# Patient Record
Sex: MALE | Race: WHITE | NOT HISPANIC OR LATINO | URBAN - METROPOLITAN AREA
[De-identification: names, ages, dates, MRNs, and addresses within clinical notes are randomized per-mention and may not be internally consistent; named-entity substitution may affect disease eponyms.]

---

## 2017-09-26 ENCOUNTER — AMBULATORY SURGICAL CENTER (AMBULATORY)
Dept: URBAN - METROPOLITAN AREA SURGERY 17 | Facility: SURGERY | Age: 52
End: 2017-09-26
Payer: COMMERCIAL

## 2017-09-26 ENCOUNTER — OFFICE (AMBULATORY)
Dept: URBAN - METROPOLITAN AREA CLINIC 64 | Facility: CLINIC | Age: 52
End: 2017-09-26
Payer: COMMERCIAL

## 2017-09-26 VITALS
SYSTOLIC BLOOD PRESSURE: 131 MMHG | HEART RATE: 68 BPM | DIASTOLIC BLOOD PRESSURE: 93 MMHG | HEART RATE: 63 BPM | HEART RATE: 62 BPM | RESPIRATION RATE: 16 BRPM | SYSTOLIC BLOOD PRESSURE: 140 MMHG | DIASTOLIC BLOOD PRESSURE: 66 MMHG | WEIGHT: 130 LBS | HEART RATE: 45 BPM | SYSTOLIC BLOOD PRESSURE: 105 MMHG | RESPIRATION RATE: 24 BRPM | DIASTOLIC BLOOD PRESSURE: 62 MMHG | SYSTOLIC BLOOD PRESSURE: 142 MMHG | HEIGHT: 64 IN | SYSTOLIC BLOOD PRESSURE: 104 MMHG | SYSTOLIC BLOOD PRESSURE: 92 MMHG | HEART RATE: 65 BPM | SYSTOLIC BLOOD PRESSURE: 136 MMHG | OXYGEN SATURATION: 99 % | SYSTOLIC BLOOD PRESSURE: 118 MMHG | TEMPERATURE: 98.2 F | RESPIRATION RATE: 20 BRPM | RESPIRATION RATE: 10 BRPM | SYSTOLIC BLOOD PRESSURE: 102 MMHG | SYSTOLIC BLOOD PRESSURE: 117 MMHG | HEART RATE: 56 BPM | SYSTOLIC BLOOD PRESSURE: 123 MMHG | TEMPERATURE: 97.9 F | SYSTOLIC BLOOD PRESSURE: 101 MMHG | DIASTOLIC BLOOD PRESSURE: 74 MMHG | OXYGEN SATURATION: 100 % | HEART RATE: 58 BPM | DIASTOLIC BLOOD PRESSURE: 72 MMHG | HEART RATE: 61 BPM | OXYGEN SATURATION: 96 % | DIASTOLIC BLOOD PRESSURE: 57 MMHG | RESPIRATION RATE: 21 BRPM | SYSTOLIC BLOOD PRESSURE: 137 MMHG | DIASTOLIC BLOOD PRESSURE: 67 MMHG | OXYGEN SATURATION: 98 % | HEART RATE: 47 BPM | RESPIRATION RATE: 19 BRPM | DIASTOLIC BLOOD PRESSURE: 61 MMHG | DIASTOLIC BLOOD PRESSURE: 79 MMHG | HEART RATE: 54 BPM | DIASTOLIC BLOOD PRESSURE: 89 MMHG | DIASTOLIC BLOOD PRESSURE: 78 MMHG | HEART RATE: 64 BPM | HEART RATE: 49 BPM | SYSTOLIC BLOOD PRESSURE: 106 MMHG | HEART RATE: 66 BPM | DIASTOLIC BLOOD PRESSURE: 70 MMHG

## 2017-09-26 DIAGNOSIS — D12.3 BENIGN NEOPLASM OF TRANSVERSE COLON: ICD-10-CM

## 2017-09-26 DIAGNOSIS — D12.2 BENIGN NEOPLASM OF ASCENDING COLON: ICD-10-CM

## 2017-09-26 DIAGNOSIS — D12.0 BENIGN NEOPLASM OF CECUM: ICD-10-CM

## 2017-09-26 DIAGNOSIS — K63.5 POLYP OF COLON: ICD-10-CM

## 2017-09-26 DIAGNOSIS — K64.8 OTHER HEMORRHOIDS: ICD-10-CM

## 2017-09-26 DIAGNOSIS — Z86.010 PERSONAL HISTORY OF COLONIC POLYPS: ICD-10-CM

## 2017-09-26 LAB
GI HISTOLOGY: A. UNSPECIFIED: (no result)
GI HISTOLOGY: B. SELECT: (no result)
GI HISTOLOGY: C. UNSPECIFIED: (no result)
GI HISTOLOGY: D. SELECT: (no result)
GI HISTOLOGY: PDF REPORT: (no result)

## 2017-09-26 PROCEDURE — 88305 TISSUE EXAM BY PATHOLOGIST: CPT

## 2017-09-26 PROCEDURE — 45385 COLONOSCOPY W/LESION REMOVAL: CPT | Mod: 33

## 2017-09-26 RX ADMIN — PROPOFOL 50 MG: 10 INJECTION, EMULSION INTRAVENOUS at 08:00

## 2017-09-26 RX ADMIN — PROPOFOL 50 MG: 10 INJECTION, EMULSION INTRAVENOUS at 08:03

## 2017-09-26 RX ADMIN — LIDOCAINE HYDROCHLORIDE 25 MG: 10 INJECTION, SOLUTION EPIDURAL; INFILTRATION; INTRACAUDAL; PERINEURAL at 07:49

## 2017-09-26 RX ADMIN — PROPOFOL 100 MG: 10 INJECTION, EMULSION INTRAVENOUS at 07:49

## 2017-09-26 RX ADMIN — PROPOFOL 50 MG: 10 INJECTION, EMULSION INTRAVENOUS at 08:17

## 2017-09-26 RX ADMIN — PROPOFOL 50 MG: 10 INJECTION, EMULSION INTRAVENOUS at 07:56

## 2017-09-26 RX ADMIN — PROPOFOL 25 MG: 10 INJECTION, EMULSION INTRAVENOUS at 08:07

## 2017-09-26 RX ADMIN — PROPOFOL 25 MG: 10 INJECTION, EMULSION INTRAVENOUS at 08:11

## 2017-09-26 RX ADMIN — PROPOFOL 50 MG: 10 INJECTION, EMULSION INTRAVENOUS at 07:57

## 2017-09-26 RX ADMIN — PROPOFOL 25 MG: 10 INJECTION, EMULSION INTRAVENOUS at 07:50

## 2017-09-26 RX ADMIN — PROPOFOL 25 MG: 10 INJECTION, EMULSION INTRAVENOUS at 07:49

## 2017-10-02 ENCOUNTER — OFFICE (AMBULATORY)
Dept: URBAN - METROPOLITAN AREA CLINIC 75 | Facility: CLINIC | Age: 52
End: 2017-10-02
Payer: COMMERCIAL

## 2017-10-02 VITALS — HEIGHT: 64 IN

## 2017-10-02 DIAGNOSIS — K64.2 THIRD DEGREE HEMORRHOIDS: ICD-10-CM

## 2017-10-02 PROBLEM — K64.8 HEMORRHOIDS, INTERNAL: Status: ACTIVE | Noted: 2017-10-02

## 2017-10-02 PROBLEM — K64.1 SECOND DEGREE HEMORRHOIDS: Status: ACTIVE | Noted: 2017-10-02

## 2017-10-02 PROBLEM — K62.5 RECTAL BLEEDING: Status: ACTIVE | Noted: 2017-10-02

## 2017-10-02 PROCEDURE — 46221 LIGATION OF HEMORRHOID(S): CPT | Performed by: INTERNAL MEDICINE

## 2017-10-02 NOTE — SERVICEHPINOTES
Mr. Silva is a pleasant 52-year-old gentleman who has a history of polyps, status post recent colonoscopy. He also has issues with hemorrhoids. He'll have bleeding at times will have swelling. He has to push the hemorrhoids in at times. He therefore presents for hemorrhoid banding.

## 2017-10-16 ENCOUNTER — OFFICE (AMBULATORY)
Dept: URBAN - METROPOLITAN AREA CLINIC 75 | Facility: CLINIC | Age: 52
End: 2017-10-16
Payer: COMMERCIAL

## 2017-10-16 VITALS — HEIGHT: 64 IN

## 2017-10-16 DIAGNOSIS — K64.1 SECOND DEGREE HEMORRHOIDS: ICD-10-CM

## 2017-10-16 PROCEDURE — 46221 LIGATION OF HEMORRHOID(S): CPT | Performed by: INTERNAL MEDICINE

## 2017-10-16 NOTE — SERVICEHPINOTES
Mr. Sliva is a pleasant 52-year-old gentleman who has a history of polyps, status post recent colonoscopy. He also has issues with hemorrhoids. He'll have bleeding at times will have swelling. He has to push the hemorrhoids in at times. He therefore presents for his 2nd hemorrhoid banding

## 2017-10-30 ENCOUNTER — OFFICE (AMBULATORY)
Dept: URBAN - METROPOLITAN AREA CLINIC 75 | Facility: CLINIC | Age: 52
End: 2017-10-30
Payer: COMMERCIAL

## 2017-10-30 VITALS — HEIGHT: 64 IN

## 2017-10-30 DIAGNOSIS — K64.1 SECOND DEGREE HEMORRHOIDS: ICD-10-CM

## 2017-10-30 DIAGNOSIS — K64.4 RESIDUAL HEMORRHOIDAL SKIN TAGS: ICD-10-CM

## 2017-10-30 DIAGNOSIS — K62.5 HEMORRHAGE OF ANUS AND RECTUM: ICD-10-CM

## 2017-10-30 PROCEDURE — 46221 LIGATION OF HEMORRHOID(S): CPT | Performed by: INTERNAL MEDICINE

## 2020-09-17 VITALS
SYSTOLIC BLOOD PRESSURE: 87 MMHG | DIASTOLIC BLOOD PRESSURE: 44 MMHG | DIASTOLIC BLOOD PRESSURE: 67 MMHG | HEART RATE: 69 BPM | HEART RATE: 79 BPM | TEMPERATURE: 99.6 F | RESPIRATION RATE: 8 BRPM | RESPIRATION RATE: 14 BRPM | RESPIRATION RATE: 17 BRPM | TEMPERATURE: 95.2 F | OXYGEN SATURATION: 99 % | OXYGEN SATURATION: 97 % | HEIGHT: 64 IN | OXYGEN SATURATION: 100 % | DIASTOLIC BLOOD PRESSURE: 45 MMHG | SYSTOLIC BLOOD PRESSURE: 90 MMHG | WEIGHT: 145 LBS | HEART RATE: 62 BPM | HEART RATE: 59 BPM | SYSTOLIC BLOOD PRESSURE: 118 MMHG | RESPIRATION RATE: 18 BRPM | OXYGEN SATURATION: 98 % | RESPIRATION RATE: 16 BRPM | DIASTOLIC BLOOD PRESSURE: 52 MMHG | DIASTOLIC BLOOD PRESSURE: 93 MMHG | HEART RATE: 72 BPM | SYSTOLIC BLOOD PRESSURE: 89 MMHG | DIASTOLIC BLOOD PRESSURE: 79 MMHG | RESPIRATION RATE: 21 BRPM | HEART RATE: 68 BPM | RESPIRATION RATE: 20 BRPM | SYSTOLIC BLOOD PRESSURE: 88 MMHG | SYSTOLIC BLOOD PRESSURE: 119 MMHG | HEART RATE: 67 BPM | HEART RATE: 60 BPM | SYSTOLIC BLOOD PRESSURE: 92 MMHG | SYSTOLIC BLOOD PRESSURE: 138 MMHG

## 2020-09-21 ENCOUNTER — OFFICE (AMBULATORY)
Dept: URBAN - METROPOLITAN AREA LAB 2 | Facility: LAB | Age: 55
End: 2020-09-21
Payer: COMMERCIAL

## 2020-09-21 DIAGNOSIS — Z20.828 CONTACT WITH AND (SUSPECTED) EXPOSURE TO OTHER VIRAL COMMUNI: ICD-10-CM

## 2020-09-21 PROBLEM — Z86.010 SURVEILLANCE DUE TO PRIOR COLONIC NEOPLASIA: Status: ACTIVE | Noted: 2020-09-22

## 2020-09-21 PROCEDURE — 87633 RESP VIRUS 12-25 TARGETS: CPT | Performed by: INTERNAL MEDICINE

## 2020-09-21 PROCEDURE — U0002 COVID-19 LAB TEST NON-CDC: HCPCS | Performed by: INTERNAL MEDICINE

## 2020-09-22 ENCOUNTER — OFFICE (AMBULATORY)
Dept: URBAN - METROPOLITAN AREA PATHOLOGY 4 | Facility: PATHOLOGY | Age: 55
End: 2020-09-22
Payer: COMMERCIAL

## 2020-09-22 ENCOUNTER — AMBULATORY SURGICAL CENTER (AMBULATORY)
Dept: URBAN - METROPOLITAN AREA SURGERY 17 | Facility: SURGERY | Age: 55
End: 2020-09-22
Payer: COMMERCIAL

## 2020-09-22 DIAGNOSIS — D12.0 BENIGN NEOPLASM OF CECUM: ICD-10-CM

## 2020-09-22 DIAGNOSIS — Z86.010 PERSONAL HISTORY OF COLONIC POLYPS: ICD-10-CM

## 2020-09-22 DIAGNOSIS — D12.3 BENIGN NEOPLASM OF TRANSVERSE COLON: ICD-10-CM

## 2020-09-22 DIAGNOSIS — K63.5 POLYP OF COLON: ICD-10-CM

## 2020-09-22 DIAGNOSIS — K64.8 OTHER HEMORRHOIDS: ICD-10-CM

## 2020-09-22 LAB
GI HISTOLOGY: A. UNSPECIFIED: (no result)
GI HISTOLOGY: B. UNSPECIFIED: (no result)
GI HISTOLOGY: PDF REPORT: (no result)

## 2020-09-22 PROCEDURE — 45385 COLONOSCOPY W/LESION REMOVAL: CPT | Mod: 33 | Performed by: INTERNAL MEDICINE

## 2020-09-22 PROCEDURE — 88305 TISSUE EXAM BY PATHOLOGIST: CPT | Mod: 33 | Performed by: INTERNAL MEDICINE

## 2020-09-22 NOTE — SERVICEHPINOTES
55-year-old gentleman who has a history of hemorrhoids, I have done banding on him in the past.  He also has a history of precancerous polyps and presents for follow-up colonoscopy.  Family history is negative.

## 2020-10-01 ENCOUNTER — OFFICE (AMBULATORY)
Dept: URBAN - METROPOLITAN AREA CLINIC 75 | Facility: CLINIC | Age: 55
End: 2020-10-01
Payer: COMMERCIAL

## 2020-10-01 VITALS — HEIGHT: 64 IN

## 2020-10-01 DIAGNOSIS — K64.1 SECOND DEGREE HEMORRHOIDS: ICD-10-CM

## 2020-10-01 DIAGNOSIS — K64.8 OTHER HEMORRHOIDS: ICD-10-CM

## 2020-10-01 PROCEDURE — 46221 LIGATION OF HEMORRHOID(S): CPT | Performed by: INTERNAL MEDICINE

## 2020-10-01 NOTE — SERVICEHPINOTES
55-year-old gentleman who has a history of polyps.  He is status post recent colonoscopy.  He also is a  [employer] and as a result has had problems with hemorrhoids.  I have banded him years ago.  He's been having recurrent issues with hemorrhoids so we once again presents for banding.

## 2020-10-16 ENCOUNTER — OFFICE (AMBULATORY)
Dept: URBAN - METROPOLITAN AREA CLINIC 75 | Facility: CLINIC | Age: 55
End: 2020-10-16

## 2020-10-16 VITALS — HEIGHT: 64 IN

## 2020-10-16 DIAGNOSIS — K64.8 OTHER HEMORRHOIDS: ICD-10-CM

## 2020-10-16 DIAGNOSIS — K62.5 HEMORRHAGE OF ANUS AND RECTUM: ICD-10-CM

## 2020-10-16 DIAGNOSIS — K64.1 SECOND DEGREE HEMORRHOIDS: ICD-10-CM

## 2020-10-16 PROCEDURE — 46221 LIGATION OF HEMORRHOID(S): CPT | Performed by: INTERNAL MEDICINE

## 2020-10-16 NOTE — SERVICEHPINOTES
55-year-old gentleman who has a history of polyps. He is status post recent colonoscopy. He also is a  [employer] and as a result has had problems with hemorrhoids. I have banded him years ago. He's been having recurrent issues with hemorrhoids so he once again presents for banding.  This is his 2nd banding.

## 2020-11-02 ENCOUNTER — OFFICE (AMBULATORY)
Dept: URBAN - METROPOLITAN AREA CLINIC 75 | Facility: CLINIC | Age: 55
End: 2020-11-02

## 2020-11-02 VITALS — HEIGHT: 64 IN

## 2020-11-02 DIAGNOSIS — K64.1 SECOND DEGREE HEMORRHOIDS: ICD-10-CM

## 2020-11-02 PROCEDURE — 46221 LIGATION OF HEMORRHOID(S): CPT | Performed by: INTERNAL MEDICINE

## 2020-11-02 NOTE — SERVICEHPINOTES
55-year-old gentleman who has a history of polyps. He is status post recent colonoscopy. He also is a  [employer] and as a result has had problems with hemorrhoids. I have banded him years ago. He's been having recurrent issues with hemorrhoids so he once again presents for banding. This is his 3rd banding.

## 2021-04-03 NOTE — SERVICENOTES
[FreeTextEntry1] : Assessment: \par Right Knee OA\par \par Plan:\par The Right Knee was injected as described above. Today they will rest, ice and use tylenol as needed for pain. \par \par F/U as needed\par All medical record entries made by the PA/Segundoibmasood/Fellow are at my, Dr. Black Lopez's direction and personally dictated by me on 03/31/2021]. I have reviewed the chart and agree that the record accurately reflects my personal performance of the history, physical exam, assessment, and plan. I have also personally directed reviewed, and agreed with the chart.\par \par 
WT:132 B/p:120/70 Pulse 72